# Patient Record
Sex: FEMALE | Race: WHITE | NOT HISPANIC OR LATINO | URBAN - METROPOLITAN AREA
[De-identification: names, ages, dates, MRNs, and addresses within clinical notes are randomized per-mention and may not be internally consistent; named-entity substitution may affect disease eponyms.]

---

## 2023-10-22 ENCOUNTER — OFFICE VISIT (OUTPATIENT)
Dept: URGENT CARE | Facility: CLINIC | Age: 33
End: 2023-10-22
Payer: COMMERCIAL

## 2023-10-22 VITALS
HEIGHT: 62 IN | BODY MASS INDEX: 33.68 KG/M2 | HEART RATE: 98 BPM | DIASTOLIC BLOOD PRESSURE: 62 MMHG | SYSTOLIC BLOOD PRESSURE: 110 MMHG | OXYGEN SATURATION: 96 % | RESPIRATION RATE: 16 BRPM | TEMPERATURE: 97.2 F | WEIGHT: 183 LBS

## 2023-10-22 DIAGNOSIS — S05.02XA ABRASION OF LEFT CORNEA, INITIAL ENCOUNTER: ICD-10-CM

## 2023-10-22 PROCEDURE — 3078F DIAST BP <80 MM HG: CPT | Performed by: REGISTERED NURSE

## 2023-10-22 PROCEDURE — 99213 OFFICE O/P EST LOW 20 MIN: CPT | Performed by: REGISTERED NURSE

## 2023-10-22 PROCEDURE — 3074F SYST BP LT 130 MM HG: CPT | Performed by: REGISTERED NURSE

## 2023-10-22 RX ORDER — HYDROXYZINE HYDROCHLORIDE 10 MG/1
TABLET, FILM COATED ORAL
COMMUNITY
Start: 2023-05-30 | End: 2025-05-29

## 2023-10-22 RX ORDER — OFLOXACIN 3 MG/ML
1 SOLUTION/ DROPS OPHTHALMIC 4 TIMES DAILY
Qty: 5 ML | Refills: 0 | Status: SHIPPED | OUTPATIENT
Start: 2023-10-22

## 2023-10-22 RX ORDER — BUSPIRONE HYDROCHLORIDE 15 MG/1
15 TABLET ORAL
COMMUNITY
Start: 2023-09-29 | End: 2025-09-28

## 2023-10-22 ASSESSMENT — ENCOUNTER SYMPTOMS
CHILLS: 0
SHORTNESS OF BREATH: 0
DIZZINESS: 0
FEVER: 0
HEADACHES: 0

## 2023-10-22 ASSESSMENT — VISUAL ACUITY: OU: 1

## 2023-10-22 NOTE — PROGRESS NOTES
"Subjective:   Ben Mcfarland is a 32 y.o. female who presents for Eye Pain (Eye irritation, possible foreign body in eye )    HPI  Last night taking off makeup felt like something got into the left eye, still feels like sand in the eye. Having some watery eye and slight swelling last night. Slight photophobia. Does not wear contact. Denies eye pain, bright flashing lights, acute vision changes, fever, neck pain    Review of Systems   Constitutional:  Negative for chills and fever.   Respiratory:  Negative for shortness of breath.    Cardiovascular:  Negative for chest pain.   Skin:  Negative for rash.   Neurological:  Negative for dizziness and headaches.     Medications, Allergies, and current problem list reviewed today in Epic.     Objective:     /62   Pulse 98   Temp 36.2 °C (97.2 °F)   Resp 16   Ht 1.575 m (5' 2\")   Wt 83 kg (183 lb)   SpO2 96%     Physical Exam  Vitals and nursing note reviewed.   Constitutional:       Appearance: Normal appearance. She is not ill-appearing or toxic-appearing.   HENT:      Mouth/Throat:      Mouth: Mucous membranes are moist.   Eyes:      General: Lids are normal. Vision grossly intact.         Left eye: No foreign body, discharge or hordeolum.      Extraocular Movements:      Left eye: Normal extraocular motion and no nystagmus.      Conjunctiva/sclera:      Left eye: Left conjunctiva is not injected. No hemorrhage.     Pupils: Pupils are equal, round, and reactive to light.        Comments: Woods lamp examination with fluorescein shows small corneal abrasion 1:00 region left iris.   Cardiovascular:      Rate and Rhythm: Normal rate.   Pulmonary:      Effort: Pulmonary effort is normal.   Abdominal:      General: Abdomen is flat.      Palpations: Abdomen is soft.   Musculoskeletal:         General: Normal range of motion.      Cervical back: Normal range of motion.   Skin:     General: Skin is warm and dry.      Capillary Refill: Capillary refill takes less " than 2 seconds.      Findings: No rash.   Neurological:      Mental Status: She is alert and oriented to person, place, and time.      Cranial Nerves: No cranial nerve deficit.   Psychiatric:         Mood and Affect: Mood normal.         Assessment/Plan:     Diagnosis and associated orders:     1. Abrasion of left cornea, initial encounter  ofloxacin (OCUFLOX) 0.3 % Solution           Comments/MDM:   Differential diagnosis discussed     Pleasant 32-year-old female presenting with left eye irritation and foreign body sensation after getting make-up in her eye yesterday.  Has had some slight photophobia.  Does not wear contacts.  No other red flag signs or symptoms.  Patient is nontoxic, normal EOMs, neurologically intact, Woods lamp examination shows small corneal abrasion 1:00 region left iris, no conjunctival injection or hemorrhage or exudate.  Given these findings will start on ofloxacin drops.  Discussed eye protection.  Recommend following up with eye doctor.  Reviewed signs and symptoms at length that require immediate attention    Return to clinic or go to ED if symptoms worsen or persist. Indications for ED discussed at length. Patient/Parent/Guardian voices understanding. Follow-up with your primary care provider in 3-5 days. Red flag symptoms discussed. All side effects of medication discussed including allergic response, GI upset, tendon injury, rash, sedation etc.    I personally reviewed prior external notes and test results pertinent to today's visit as well as additional imaging and testing completed in clinic today.     Please note that this dictation was created using voice recognition software. I have made every reasonable attempt to correct obvious errors, but I expect that there are errors of grammar and possibly content that I did not discover before finalizing the note.    This note was electronically signed by CHRISTIANO Lugo